# Patient Record
Sex: FEMALE | Race: WHITE | NOT HISPANIC OR LATINO | URBAN - METROPOLITAN AREA
[De-identification: names, ages, dates, MRNs, and addresses within clinical notes are randomized per-mention and may not be internally consistent; named-entity substitution may affect disease eponyms.]

---

## 2024-01-25 ENCOUNTER — WEB ENCOUNTER (OUTPATIENT)
Dept: URBAN - METROPOLITAN AREA CLINIC 54 | Facility: CLINIC | Age: 24
End: 2024-01-25

## 2024-01-25 ENCOUNTER — OFFICE VISIT (OUTPATIENT)
Dept: URBAN - METROPOLITAN AREA CLINIC 54 | Facility: CLINIC | Age: 24
End: 2024-01-25
Payer: COMMERCIAL

## 2024-01-25 ENCOUNTER — LAB OUTSIDE AN ENCOUNTER (OUTPATIENT)
Dept: URBAN - METROPOLITAN AREA CLINIC 54 | Facility: CLINIC | Age: 24
End: 2024-01-25

## 2024-01-25 VITALS
SYSTOLIC BLOOD PRESSURE: 121 MMHG | WEIGHT: 215 LBS | HEIGHT: 64 IN | DIASTOLIC BLOOD PRESSURE: 74 MMHG | BODY MASS INDEX: 36.7 KG/M2 | HEART RATE: 103 BPM | TEMPERATURE: 98.4 F

## 2024-01-25 DIAGNOSIS — R19.7 DIARRHEA, UNSPECIFIED TYPE: ICD-10-CM

## 2024-01-25 DIAGNOSIS — R11.10 RECURRENT VOMITING: ICD-10-CM

## 2024-01-25 DIAGNOSIS — R10.84 GENERALIZED ABDOMINAL PAIN: ICD-10-CM

## 2024-01-25 DIAGNOSIS — R14.2 BELCHING: ICD-10-CM

## 2024-01-25 PROCEDURE — 99204 OFFICE O/P NEW MOD 45 MIN: CPT

## 2024-01-25 RX ORDER — PANTOPRAZOLE SODIUM 40 MG/1
1 TABLET TABLET, DELAYED RELEASE ORAL ONCE A DAY
Qty: 30 | Refills: 1 | OUTPATIENT
Start: 2024-01-25

## 2024-01-25 NOTE — HPI-TODAY'S VISIT:
1/25/24: Pt is a 24 yo female with a PMH of endometriosis and PCOS who states she was referred by her PCP LIANG Torres at [facility] (no referral or records in chart) for recurrent acute episodes of nausea/vomiting and diarrhea. First episode was in Nov 2022, about a month into taking Mounjaro for insulin resistence and weight loss. Then again in Feb 2023 (discontinued Mounjaro at this time), and March 2023. Pt thought she was past it but had a recurrence this week. Each episodes starts with a sulfur burp which is then followed by intractable nausea and vomiting (15+ times) and simultaneous diarrhea (15+ times). Associated with generalized periumbilical pain and borborygmi. Lasts about 24 hours then resolves with supportive measures in the ER. Episode this week lasted two half days (morning until 4pm then resolved, repeated the next day). No association with food. Denies hematemesis, hematochezia, melena. Rare heartburn/reflux a few times a month. Denies marijuana or tobacco use. Drinks socially. Rarely takes NSAIDs. Denies migraines. Getting  in March.  Prior workup: [facility]. Dx with probable viral gastroenteritis. Transvaginal US in Feb 2023 was unrevealing. CT in March 2023 showed possible recent ruptured right ovarian follicle with trace pelvic ascites but was otherwise normal. Labs have been unremarkable, no evidence of pancreatitis with normal lipase. LFTs okay. 3/2023: AST 16, ALT 30, AP 62, TB 0.9, lipase 31, WBC 9.4, Hgb 15.1. Pt is s/p appy, still has gallbladder. No RUQ US done. Reportedly saw [facility] who planned for EGD but pt cancelled when she was feeling better. Pt had labs and H pylori breath test yesterday with PCP.

## 2024-02-01 ENCOUNTER — LAB (OUTPATIENT)
Dept: URBAN - METROPOLITAN AREA CLINIC 54 | Facility: CLINIC | Age: 24
End: 2024-02-01

## 2024-02-21 ENCOUNTER — US (OUTPATIENT)
Dept: URBAN - METROPOLITAN AREA CLINIC 53 | Facility: CLINIC | Age: 24
End: 2024-02-21
Payer: COMMERCIAL

## 2024-02-21 DIAGNOSIS — K76.0 FATTY (CHANGE OF) LIVER: ICD-10-CM

## 2024-02-21 PROCEDURE — 76705 ECHO EXAM OF ABDOMEN: CPT | Performed by: INTERNAL MEDICINE

## 2024-02-22 ENCOUNTER — EGD (OUTPATIENT)
Dept: URBAN - METROPOLITAN AREA SURGERY CENTER 14 | Facility: SURGERY CENTER | Age: 24
End: 2024-02-22

## 2024-02-22 ENCOUNTER — LAB (OUTPATIENT)
Dept: URBAN - METROPOLITAN AREA CLINIC 4 | Facility: CLINIC | Age: 24
End: 2024-02-22
Payer: COMMERCIAL

## 2024-02-22 DIAGNOSIS — K31.89 OTHER DISEASES OF STOMACH AND DUODENUM: ICD-10-CM

## 2024-02-22 DIAGNOSIS — K29.70 GASTRITIS, UNSPECIFIED, WITHOUT BLEEDING: ICD-10-CM

## 2024-02-22 PROCEDURE — 88342 IMHCHEM/IMCYTCHM 1ST ANTB: CPT | Performed by: PATHOLOGY

## 2024-02-22 PROCEDURE — 88305 TISSUE EXAM BY PATHOLOGIST: CPT | Performed by: PATHOLOGY

## 2024-02-22 PROCEDURE — 88312 SPECIAL STAINS GROUP 1: CPT | Performed by: PATHOLOGY

## 2024-02-22 RX ORDER — PANTOPRAZOLE SODIUM 40 MG/1
TAKE 1 TABLET BY MOUTH EVERY DAY FOR 30 DAYS TABLET, DELAYED RELEASE ORAL
Qty: 90 TABLET | Refills: 1 | Status: ACTIVE | COMMUNITY

## 2024-02-28 ENCOUNTER — OV EP (OUTPATIENT)
Dept: URBAN - METROPOLITAN AREA CLINIC 54 | Facility: CLINIC | Age: 24
End: 2024-02-28

## 2024-03-04 ENCOUNTER — OV EP (OUTPATIENT)
Dept: URBAN - METROPOLITAN AREA CLINIC 54 | Facility: CLINIC | Age: 24
End: 2024-03-04
Payer: COMMERCIAL

## 2024-03-04 VITALS
WEIGHT: 209 LBS | HEIGHT: 64 IN | BODY MASS INDEX: 35.68 KG/M2 | HEART RATE: 92 BPM | TEMPERATURE: 98.4 F | DIASTOLIC BLOOD PRESSURE: 80 MMHG | SYSTOLIC BLOOD PRESSURE: 128 MMHG

## 2024-03-04 DIAGNOSIS — R19.7 DIARRHEA, UNSPECIFIED TYPE: ICD-10-CM

## 2024-03-04 DIAGNOSIS — R11.10 RECURRENT VOMITING: ICD-10-CM

## 2024-03-04 DIAGNOSIS — R14.2 BELCHING: ICD-10-CM

## 2024-03-04 DIAGNOSIS — K76.0 FATTY LIVER: ICD-10-CM

## 2024-03-04 DIAGNOSIS — R10.84 GENERALIZED ABDOMINAL PAIN: ICD-10-CM

## 2024-03-04 PROBLEM — 197321007: Status: ACTIVE | Noted: 2024-03-04

## 2024-03-04 PROCEDURE — 99214 OFFICE O/P EST MOD 30 MIN: CPT

## 2024-03-04 RX ORDER — PANTOPRAZOLE SODIUM 40 MG/1
TAKE 1 TABLET BY MOUTH EVERY DAY FOR 30 DAYS TABLET, DELAYED RELEASE ORAL
Qty: 90 TABLET | Refills: 1 | Status: ACTIVE | COMMUNITY

## 2024-03-04 RX ORDER — PANTOPRAZOLE SODIUM 40 MG/1
1 TABLET ORALLY ONCE A DAY TABLET, DELAYED RELEASE ORAL
Qty: 90 | Refills: 1

## 2024-03-04 NOTE — HPI-TODAY'S VISIT:
1/25/24: Pt is a 24 yo female with a PMH of endometriosis and PCOS who states she was referred by her PCP LIANG Torres at [facility] (no referral or records in chart) for recurrent acute episodes of nausea/vomiting and diarrhea. First episode was in Nov 2022, about a month into taking Mounjaro for insulin resistence and weight loss. Then again in Feb 2023 (discontinued Mounjaro at this time), and March 2023. Pt thought she was past it but had a recurrence this week. Each episodes starts with a sulfur burp which is then followed by intractable nausea and vomiting (15+ times) and simultaneous diarrhea (15+ times). Associated with generalized periumbilical pain and borborygmi. Lasts about 24 hours then resolves with supportive measures in the ER. Episode this week lasted two half days (morning until 4pm then resolved, repeated the next day). No association with food. Denies hematemesis, hematochezia, melena. Rare heartburn/reflux a few times a month. Denies marijuana or tobacco use. Drinks socially. Rarely takes NSAIDs. Denies migraines. Getting  in March.  Prior workup: [facility]. Dx with probable viral gastroenteritis. Transvaginal US in Feb 2023 was unrevealing. CT in March 2023 showed possible recent ruptured right ovarian follicle with trace pelvic ascites but was otherwise normal. Labs have been unremarkable, no evidence of pancreatitis with normal lipase. LFTs okay. 3/2023: AST 16, ALT 30, AP 62, TB 0.9, lipase 31, WBC 9.4, Hgb 15.1. Pt is s/p appy, still has gallbladder. No RUQ US done. Reportedly saw [facility] who planned for EGD but pt cancelled when she was feeling better. Pt had labs and H pylori breath test yesterday with PCP.  3/4/24 Follow Up: Pt RTC for follow up. EGD showed 2 cm hiatal hernia but was otherwise normal. RUQ US showed fatty liver, normal gallbladder and bile ducts. H pylori breath test and celiac serologies were also negative. No etiology for episodic n/v/d found. No recurrent episodes. Pt wonders if it is related to periods, cannot correlate timing of past episodes. Cannot tell if panoprazole helps but she would like to have it on hand.